# Patient Record
Sex: MALE | Race: WHITE | NOT HISPANIC OR LATINO | URBAN - METROPOLITAN AREA
[De-identification: names, ages, dates, MRNs, and addresses within clinical notes are randomized per-mention and may not be internally consistent; named-entity substitution may affect disease eponyms.]

---

## 2020-05-16 ENCOUNTER — EMERGENCY (EMERGENCY)
Facility: HOSPITAL | Age: 35
LOS: 0 days | Discharge: HOME | End: 2020-05-16
Attending: EMERGENCY MEDICINE | Admitting: EMERGENCY MEDICINE
Payer: COMMERCIAL

## 2020-05-16 VITALS
HEART RATE: 88 BPM | HEIGHT: 70 IN | SYSTOLIC BLOOD PRESSURE: 128 MMHG | TEMPERATURE: 98 F | DIASTOLIC BLOOD PRESSURE: 79 MMHG | WEIGHT: 153 LBS | OXYGEN SATURATION: 100 % | RESPIRATION RATE: 18 BRPM
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
## 2020-05-16 DIAGNOSIS — S61.219A LACERATION WITHOUT FOREIGN BODY OF UNSPECIFIED FINGER WITHOUT DAMAGE TO NAIL, INITIAL ENCOUNTER: ICD-10-CM
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
## 2020-05-16 DIAGNOSIS — S61.313A LACERATION WITHOUT FOREIGN BODY OF LEFT MIDDLE FINGER WITH DAMAGE TO NAIL, INITIAL ENCOUNTER: ICD-10-CM
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
## 2020-05-16 DIAGNOSIS — S62.663B NONDISPLACED FRACTURE OF DISTAL PHALANX OF LEFT MIDDLE FINGER, INITIAL ENCOUNTER FOR OPEN FRACTURE: ICD-10-CM

## 2020-05-16 PROCEDURE — 99284 EMERGENCY DEPT VISIT MOD MDM: CPT | Mod: 25

## 2020-05-16 PROCEDURE — 73140 X-RAY EXAM OF FINGER(S): CPT | Mod: 26,LT

## 2020-05-16 PROCEDURE — 12002 RPR S/N/AX/GEN/TRNK2.6-7.5CM: CPT | Mod: 59

## 2020-05-16 PROCEDURE — 26750 TREAT FINGER FRACTURE EACH: CPT | Mod: 54,F2

## 2020-05-16 RX ORDER — IBUPROFEN 200 MG
600 TABLET ORAL ONCE
Refills: 0 | Status: COMPLETED | OUTPATIENT
Start: 2020-05-16 | End: 2020-05-16

## 2020-05-16 RX ORDER — CEPHALEXIN 500 MG
1 CAPSULE ORAL
Qty: 40 | Refills: 0
Start: 2020-05-16 | End: 2020-05-25

## 2020-05-16 RX ORDER — CEFAZOLIN SODIUM 1 G
2000 VIAL (EA) INJECTION ONCE
Refills: 0 | Status: COMPLETED | OUTPATIENT
Start: 2020-05-16 | End: 2020-05-16

## 2020-05-16 RX ADMIN — Medication 100 MILLIGRAM(S): at 17:17

## 2020-05-16 NOTE — ED PROVIDER NOTE - ATTENDING CONTRIBUTION TO CARE
I personally evaluated the patient. I reviewed the Resident’s or Physician Assistant’s note (as assigned above), and agree with the findings and plan except as documented in my note.     34 male here for lacerations to left third and fourth digits from a table saw accident prior to arrival.     ROS otherwise unremarkable    PE: male in no distress. CV: pulses intact. CHEST: normal work of breathing. ABD: nondistended. SKIN: lacerations to left third and fourth digits distal aspect with tissue destruction to third. EXT: FROM. no evidence of tendon injury. NEURO: AAO 3 no focal deficits.     Impression: finger laceration    Plan: wound management, Hand consult, outpatient care

## 2020-05-16 NOTE — ED PROVIDER NOTE - NS ED ROS FT
Review of Systems         Constitutional: (-) fever (-) chills (-) weakness       EENT:(-) sore throat (-) congestion       Cardiovascular: (-) chest pain (-) syncope       Respiratory: (-) cough, (-) shortness of breath       Gastrointestinal: (-) abdominal pain (-) vomiting (-) diarrhea (-) nausea (-) constipation       Genitourinary: (-) dysuria (-) frequency (-) hematuria       Musculoskeletal: (-) neck pain (-) back pain (+) joint pain       Integumentary: (-) rash       Neurological: (-) headache       Psych: (-) psych history

## 2020-05-16 NOTE — ED PROCEDURE NOTE - CPROC ED POST PROC CARE GUIDE1
Instructed patient/caregiver to follow-up with primary care physician./Verbal/written post procedure instructions were given to patient/caregiver./Instructed patient/caregiver regarding signs and symptoms of infection./Elevate the injured extremity as instructed./Keep the cast/splint/dressing clean and dry.
Instructed patient/caregiver to follow-up with primary care physician./Verbal/written post procedure instructions were given to patient/caregiver./Keep the cast/splint/dressing clean and dry./Instructed patient/caregiver regarding signs and symptoms of infection.
Verbal/written post procedure instructions were given to patient/caregiver./Instructed patient/caregiver regarding signs and symptoms of infection./Elevate the injured extremity as instructed./Keep the cast/splint/dressing clean and dry./Instructed patient/caregiver to follow-up with primary care physician.
Verbal/written post procedure instructions were given to patient/caregiver./Instructed patient/caregiver regarding signs and symptoms of infection./Keep the cast/splint/dressing clean and dry./Instructed patient/caregiver to follow-up with primary care physician.

## 2020-05-16 NOTE — CONSULT NOTE ADULT - ASSESSMENT
35 y/o male with table saw injury today and sustained Left 3rd digit tip evulsion laceration of pulp with non-displaced distal phalanx fracture & 4th digit small tip laceration.

## 2020-05-16 NOTE — ED PROVIDER NOTE - NSFOLLOWUPINSTRUCTIONS_ED_ALL_ED_FT
-Follow up with Dr. Roa in 1-3 days: CALL [phone] for appointment  -Take prescribed antibiotics as prescribed for your infection.  -Take 400-800 mg of Ibuprofen every 6-8 hours as needed for pain with food; food first, then medicine  -Take Tylenol 500-1000 mg every 4-6 hours as needed for pain; Do not exceed 1000 mg every 6 hours or 4000 mg per 24 hours  -RICE (rest, ice compression, elevation) for your injuries; see below  -Return to ED for worsening symptoms or concerns.    Sutured Wound Care  Sutures are stitches that can be used to close wounds. Taking care of your wound properly can help to prevent pain and infection. It can also help your wound to heal more quickly. Follow instructions from your health care provider about how to care for your sutured wound.    Supplies needed:  Soap and water.  A clean bandage (dressing), if needed.  Antibiotic ointment.  A clean towel.  How to care for your sutured wound  Keep the wound completely dry for the first 24 hours, or for as long as directed by your health care provider. After 24–48 hours, you may shower or bathe as directed by your health care provider. Do not soak or submerge the wound in water until the sutures have been removed.  After the first 24 hours, clean the wound once a day, or as often as directed by your health care provider, using the following steps:    Wash the wound with soap and water.  Rinse the wound with water to remove all soap.  Pat the wound dry with a clean towel. Do not rub the wound.    After cleaning the wound, apply a thin layer of antibiotic ointment as directed by your health care provider. This will prevent infection and keep the dressing from sticking to the wound.  Follow instructions from your health care provider about how to change your dressing:    Wash your hands with soap and water. If soap and water are not available, use hand .  Change your dressing at least once a day, or as often as told by your health care provider. If your dressing gets wet or dirty, change it.  Leave sutures and other skin closures, such as adhesive tape or skin glue, in place. These skin closures may need to stay in place for 2 weeks or longer. If adhesive strip edges start to loosen and curl up, you may trim the loose edges. Do not remove adhesive strips completely unless your health care provider tells you to do that.    Check your wound every day for signs of infection. Watch for:    Redness, swelling, or pain.  Fluid or blood.  Warmth.  Pus or a bad smell.    ImageHave the sutures removed as directed by your health care provider.  Follow these instructions at home:  Medicines     Take or apply over-the-counter and prescription medicines only as told by your health care provider.  If you were prescribed an antibiotic medicine or ointment, take or apply it as told by your health care provider. Do not stop using the antibiotic even if your condition improves.  General instructions     To help reduce scarring after your wound heals, cover your wound with clothing or apply sunscreen of at least 30 SPF whenever you are outside.  Do not scratch or pick at your wound.  Avoid stretching your wound.  Raise (elevate) the injured area above the level of your heart while you are sitting or lying down, if possible.  Drink enough fluids to keep your urine clear or pale yellow.  Keep all follow-up visits as told by your health care provider. This is important.  Contact a health care provider if:  You received a tetanus shot and you have swelling, severe pain, redness, or bleeding at the injection site.  Your wound breaks open.  You have redness, swelling, or pain around your wound.  You have fluid or blood coming from your wound.  Your wound feels warm to the touch.  You have a fever.  You notice something coming out of your wound, such as wood or glass.  You have pain that does not get better with medicine.  The skin near your wound changes color.  You need to change your dressing very frequently due to a lot of fluid, blood, or pus draining from the wound.  You develop a new rash.  You develop numbness around the wound.  Get help right away if:  You develop severe swelling around your wound.  You have pus or a bad smell coming from your wound.  Your pain suddenly gets worse and is severe.  You develop painful lumps near your wound or anywhere on your body.  You have a red streak going away from your wound.  The wound is on your hand or foot and:    You cannot properly move a finger or toe.  Your fingers or toes look pale or bluish.  You have numbness that is spreading down your hand, foot, fingers, or toes.    Summary  Sutures are stitches that can be used to close wounds.  Taking care of your wound properly can help to prevent pain and infection.  Keep the wound completely dry for the first 24 hours, or for as long as directed by your health care provider. After 24–48 hours, you may shower or bathe as directed by your health care provider.    Laceration    A laceration is a cut that goes through all of the layers of the skin and into the tissue that is right under the skin. Some lacerations heal on their own. Others need to be closed with skin adhesive strips, skin glue, stitches (sutures), or staples. Proper laceration care minimizes the risk of infection and helps the laceration to heal better.  If non-absorbable stitches or staples have been placed, they must be taken out within the time frame instructed by your healthcare provider.    SEEK IMMEDIATE MEDICAL CARE IF YOU HAVE ANY OF THE FOLLOWING SYMPTOMS: swelling around the wound, worsening pain, drainage from the wound, red streaking going away from your wound, inability to move finger or toe near the laceration, or discoloration of skin near the laceration.    Fracture    A fracture is a break in one of your bones. This can occur from a variety of injuries, especially traumatic ones. Symptoms include pain, bruising, or swelling. Do not use the injured limb. If a fracture is in one of the bones below your waist, do not put weight on that limb unless instructed to do so by your healthcare provider. Crutches or a cane may have been provided. A splint or cast may have been applied by your health care provider. Make sure to keep it dry and follow up with an orthopedist as instructed.    SEEK IMMEDIATE MEDICAL CARE IF YOU HAVE ANY OF THE FOLLOWING SYMPTOMS: numbness, tingling, increasing pain, or weakness in any part of the injured limb.

## 2020-05-16 NOTE — ED PROVIDER NOTE - CARE PROVIDER_API CALL
Kim Roa (MD)  [facility]  [address] [place] [address]  Phone: [phone]  Fax: [phone]  Follow Up Time: 1-3 Days

## 2020-05-16 NOTE — ED ADULT NURSE NOTE - NSIMPLEMENTINTERV_GEN_ALL_ED
Implemented All Universal Safety Interventions:  [place] to call system. Call bell, personal items and telephone within reach. Instruct patient to call for assistance. Room bathroom lighting operational. Non-slip footwear when patient is off stretcher. Physically safe environment: no spills, clutter or unnecessary equipment. Stretcher in lowest position, wheels locked, appropriate side rails in place.

## 2020-05-16 NOTE — ED PROVIDER NOTE - OBJECTIVE STATEMENT
34 year old male no past medical history p/w lacerations. 30 minutes ago patient was using a table saw when he cut his left 3rd and 4th digit. bleeding controlled with direct pressure no numbness, paresthesias or weakness. Pain moderate, unchanged, no pall/prov factors. Right handed. tetanus UTD.

## 2020-05-16 NOTE — CONSULT NOTE ADULT - PROBLEM SELECTOR RECOMMENDATION 9
(3rd & 4th digits)    - Case d/w Orthopaedic resident (Spectra # [id number]) and photos taken & sent (Pt agreed).  He states d/w Dr. Roa.    - Dr. Roa recommends:    1. Irrigate wounds.  2. Close with Nylon sutures.  3. Apply finger splint to 3rd digit.  4. Discharge with PO antibiotics.  5. Instruct patient to call Dr. Roa's office on Monday to arrange an appointment / follow up for Tues or Wed (5/19 or 5/20).    * Plan discussed with Patient and he verbalized understanding of plan & instructions and all questions answered to patient's satisfaction.     * Dr. Roa's recommendations also d/w ER ALESHIA Cornelius.

## 2020-05-16 NOTE — ED PROVIDER NOTE - CLINICAL SUMMARY MEDICAL DECISION MAKING FREE TEXT BOX
34 male here for finger laceration. Had wound management, up to date on tetanus, ABX, hand consult, will discharge with outpatient management.

## 2020-05-16 NOTE — ED PROVIDER NOTE - CARE PROVIDERS DIRECT ADDRESSES
[email or web address]

## 2020-05-16 NOTE — ED PROVIDER NOTE - PHYSICAL EXAMINATION
Physical Exam    Vital Signs: I have reviewed the initial vital signs  Constitutional: well-nourished, appears stated age, no acute distress  Musculoskeletal: supple nontender neck, no midline tenderness, left hand: avulsive injury to left 3rd and 4th digit with nailbed involvement on left 3rd digit. fat exposed but no visible tendon injury. Radial pulse 2 +, no thenar atrophy. Wrist extension/flexion 5/5. Sensation intact throughout hand with all extensor/flexor mechanisms intact. No scaphoid tenderness. Full ROM and patient able to make a fist. Thumb intact in extension, flexion, opposition, abduction/adduction.  Integumentary: warm, dry, no rash  Neurologic: A & O x 3, CN II-XII grossly intact, all extremities’ motor and sensory functions grossly intact  Psychiatric: appropriate mood, appropriate affect

## 2020-05-16 NOTE — CONSULT NOTE ADULT - SUBJECTIVE AND OBJECTIVE BOX
Patient is a 33 y/o Right hand dominant male with no medical history presenting in ER with c/o lacerations on his Left 3rd & 4th distal digits he sustained on a table saw a couple of hours ago.    Patient reports he held pressure on the fingers immediately so bleeding has been controlled since injury.    Reports had moderate pain earlier but improved after ER PA placed a digital block.    Reports sensation has been normal distally to both affected fingertips.    Denies numbness, paresthesias or weakness with the digits.          PAST MEDICAL & SURGICAL HISTORY:  No pertinent past medical history  No significant past surgical history        MEDICATIONS  (STANDING):  Cefazolin  IVPB 2000 milliGRAM(s) IV Intermittent once (Given in ER)         Allergies  No Known Allergies      SOCIAL HISTORY:  Tobacco use:  Denies use  Alcohol use:  Socially  Drug use: Denies use      FAMILY HISTORY:  Non-contributory        Vital Signs Last 24 Hrs  T(C): 36.4 (16 May 2020 15:35), Max: 36.4 (16 May 2020 15:35)  T(F): 97.5 (16 May 2020 15:35), Max: 97.5 (16 May 2020 15:35)  HR: 88 (16 May 2020 15:35) (88 - 88)  BP: 128/79 (16 May 2020 15:35) (128/79 - 128/79)  RR: 18 (16 May 2020 15:35) (18 - 18)  SpO2: 100% (16 May 2020 15:35) (100% - 100%)          Physical Exam:  General:  WD, WN, conversant in NAD sitting on ER table.  Left Upper Extremity:    Left 3rd distal digit evulsion laceration involving the finger pulp and a small area of nail bed involvement on lateral 3rd digit .  Slight oozing noted but no arterial bleeding noted.   Normal strength and ROM, normal sensation to light touch on finger and distal tip.      Left 4th digit small laceration to distal tip and tip of nail bed. No active bleeding noted.  Normal strength and ROM, normal sensation to light touch on finger and distal tip.                LABS:  Not drawn by ER        Xray Left hand/ fingers:  Unofficial read by myself appears to have a non-displaced 3rd distal left phalanx fracture noted on lateral view.  (Official read pending)

## 2020-05-16 NOTE — ED PROVIDER NOTE - PATIENT PORTAL LINK FT
You can access the FollowMyHealth Patient Portal offered by [facility] by registering at the following website: [email or web address]. By joining [employer]’s FollowMyHealth portal, you will also be able to view your health information using other applications (apps) compatible with our system.

## 2020-05-16 NOTE — ED PROVIDER NOTE - CARE PLAN
Principal Discharge DX:	Open fracture of phalanx of left hand  Secondary Diagnosis:	Laceration of finger

## 2020-05-17 ENCOUNTER — EMERGENCY (EMERGENCY)
Facility: HOSPITAL | Age: 35
LOS: 0 days | Discharge: HOME | End: 2020-05-17
Attending: EMERGENCY MEDICINE | Admitting: EMERGENCY MEDICINE
Payer: COMMERCIAL

## 2020-05-17 VITALS
DIASTOLIC BLOOD PRESSURE: 90 MMHG | OXYGEN SATURATION: 100 % | SYSTOLIC BLOOD PRESSURE: 144 MMHG | HEIGHT: 70 IN | HEART RATE: 86 BPM | WEIGHT: 154.98 LBS | TEMPERATURE: 97 F | RESPIRATION RATE: 16 BRPM
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
## 2020-05-17 DIAGNOSIS — Z48.01 ENCOUNTER FOR CHANGE OR REMOVAL OF SURGICAL WOUND DRESSING: ICD-10-CM

## 2020-05-17 DIAGNOSIS — S61.211D LACERATION WITHOUT FOREIGN BODY OF LEFT INDEX FINGER WITHOUT DAMAGE TO NAIL, SUBSEQUENT ENCOUNTER: ICD-10-CM

## 2020-05-17 PROCEDURE — 64450 NJX AA&/STRD OTHER PN/BRANCH: CPT

## 2020-05-17 PROCEDURE — 99282 EMERGENCY DEPT VISIT SF MDM: CPT | Mod: 25

## 2020-05-17 NOTE — ED PROVIDER NOTE - CLINICAL SUMMARY MEDICAL DECISION MAKING FREE TEXT BOX
Patient had successful replacement of dressing for his laceration he has an existing follow up with dr barber we discussed indications for return at this time

## 2020-05-17 NOTE — ED PROVIDER NOTE - OBJECTIVE STATEMENT
35 yo male, no pmh, presents to ed for wound check. pt seen in ed yesterday, had laceration/avulsion to left 3rd digit, fu with hand surgeon tomorrow, here since gauze dried on wound and wanted to have changed. no dc, fever, limited rom. baseline pain, mild, aching, no radiation at site of laceration.

## 2020-05-17 NOTE — ED PROVIDER NOTE - PROGRESS NOTE DETAILS
I personally evaluated the patient. I reviewed the Resident’s or Physician Assistant’s note (as assigned above), and agree with the findings and plan except as documented in my note.  33 y/o M seen here yesterday for wound repair of left index finger and returns for wound evaluation. On exam: FROM of digits, bleeding controlled, no signs of infection. A/P: will re-evaluate. Pt has existing f/u with Dr. Roa from Ortho.

## 2020-05-17 NOTE — ED ADULT NURSE NOTE - NSIMPLEMENTINTERV_GEN_ALL_ED
Implemented All Universal Safety Interventions:  [place] to call system. Call bell, personal items and telephone within reach. Instruct patient to call for assistance. Room bathroom lighting operational. Non-slip footwear when patient is off stretcher. Physically safe environment: no spills, clutter or unnecessary equipment. Stretcher in lowest position, wheels locked, appropriate side rails in place.

## 2020-05-17 NOTE — ED PROCEDURE NOTE - GENERAL PROCEDURE DETAILS
digital block on left 3rd digit, driedgause removed s/p soaking in warm sterile water with betadine, distal tip of finger dried wrapped in vasoline dressing with finger splint applied.

## 2020-05-17 NOTE — ED PROVIDER NOTE - PHYSICAL EXAMINATION
Physical Exam    Vital Signs: I have reviewed the initial vital signs.  Constitutional: well-nourished, appears stated age, no acute distress  Eyes: Conjunctiva pink, Sclera clear,  Cardiovascular: S1 and S2, regular rate, regular rhythm, well-perfused extremities, radial pulses equal and 2+  Musculoskeletal: supple neck, no lower extremity edema, no midline tenderness, from of affected digit  Integumentary: sutures in place, dressing removed, no dc.   Neurologic: awake, alert, nvi

## 2020-05-17 NOTE — ED PROVIDER NOTE - NS ED ROS FT
Constitutional: (-) weakness  Musculoskeletal: (-) joint pain,  Integumentary: (-) bleeding, (+) lac  Neurological: (-) tingling, (-)numbness,  Allergic/Immunologic: (-) pruritus

## 2020-05-17 NOTE — ED PROVIDER NOTE - ATTENDING CONTRIBUTION TO CARE
I was present for and supervised the key and critical aspects of the procedures performed during the care of the patient. I personally evaluated the patient. I reviewed the Resident’s or Physician Assistant’s note (as assigned above), and agree with the findings and plan except as documented in my note.  35 y/o M seen here yesterday for wound repair of left index finger and returns for wound evaluation. On exam: FROM of digits, bleeding controlled, no signs of infection. A/P: will re-evaluate. Pt has existing f/u with Dr. Roa from Ortho.

## 2020-05-17 NOTE — ED PROVIDER NOTE - PATIENT PORTAL LINK FT
You can access the FollowMyHealth Patient Portal offered by [facility] by registering at the following website: [email or web address]. By joining [employer]’s FollowMyHealth portal, you will also be able to view your health information using other applications (apps) compatible with our system.

## 2020-05-17 NOTE — ED PROCEDURE NOTE - CPROC ED POST PROC CARE GUIDE1
Verbal/written post procedure instructions were given to patient/caregiver./Instructed patient/caregiver to follow-up with primary care physician./Keep the cast/splint/dressing clean and dry./Instructed patient/caregiver regarding signs and symptoms of infection.
Instructed patient/caregiver to follow-up with primary care physician./Elevate the injured extremity as instructed./Instructed patient/caregiver regarding signs and symptoms of infection./Verbal/written post procedure instructions were given to patient/caregiver.

## 2020-05-18 PROBLEM — Z78.9 OTHER SPECIFIED HEALTH STATUS: Chronic | Status: ACTIVE | Noted: 2020-05-16

## 2024-02-24 NOTE — ED ADULT NURSE NOTE - NSFALLRSKASSESSDT_ED_ALL_ED
Medicare Preventive Visit Patient Instructions  Thank you for completing your Welcome to Medicare Visit or Medicare Annual Wellness Visit today. Your next wellness visit will be due in one year (2/24/2025).  The screening/preventive services that you may require over the next 5-10 years are detailed below. Some tests may not apply to you based off risk factors and/or age. Screening tests ordered at today's visit but not completed yet may show as past due. Also, please note that scanned in results may not display below.  Preventive Screenings:  Service Recommendations Previous Testing/Comments   Colorectal Cancer Screening  Colonoscopy    Fecal Occult Blood Test (FOBT)/Fecal Immunochemical Test (FIT)  Fecal DNA/Cologuard Test  Flexible Sigmoidoscopy Age: 45-75 years old   Colonoscopy: every 10 years (May be performed more frequently if at higher risk)  OR  FOBT/FIT: every 1 year  OR  Cologuard: every 3 years  OR  Sigmoidoscopy: every 5 years  Screening may be recommended earlier than age 45 if at higher risk for colorectal cancer. Also, an individualized decision between you and your healthcare provider will decide whether screening between the ages of 76-85 would be appropriate. Colonoscopy: 06/14/2023  FOBT/FIT: Not on file  Cologuard: Not on file  Sigmoidoscopy: Not on file          Prostate Cancer Screening Individualized decision between patient and health care provider in men between ages of 55-69   Medicare will cover every 12 months beginning on the day after your 50th birthday PSA: 0.69 ng/mL           Hepatitis C Screening Once for adults born between 1945 and 1965  More frequently in patients at high risk for Hepatitis C Hep C Antibody: 09/21/2021        Diabetes Screening 1-2 times per year if you're at risk for diabetes or have pre-diabetes Fasting glucose: 106 mg/dL (8/1/2023)  A1C: 5.4 % (8/1/2023)      Cholesterol Screening Once every 5 years if you don't have a lipid disorder. May order more often  based on risk factors. Lipid panel: 11/22/2022         Other Preventive Screenings Covered by Medicare:  Abdominal Aortic Aneurysm (AAA) Screening: covered once if your at risk. You're considered to be at risk if you have a family history of AAA or a male between the age of 65-75 who smoking at least 100 cigarettes in your lifetime.  Lung Cancer Screening: covers low dose CT scan once per year if you meet all of the following conditions: (1) Age 55-77; (2) No signs or symptoms of lung cancer; (3) Current smoker or have quit smoking within the last 15 years; (4) You have a tobacco smoking history of at least 20 pack years (packs per day x number of years you smoked); (5) You get a written order from a healthcare provider.  Glaucoma Screening: covered annually if you're considered high risk: (1) You have diabetes OR (2) Family history of glaucoma OR (3)  aged 50 and older OR (4)  American aged 65 and older  Osteoporosis Screening: covered every 2 years if you meet one of the following conditions: (1) Have a vertebral abnormality; (2) On glucocorticoid therapy for more than 3 months; (3) Have primary hyperparathyroidism; (4) On osteoporosis medications and need to assess response to drug therapy.  HIV Screening: covered annually if you're between the age of 15-65. Also covered annually if you are younger than 15 and older than 65 with risk factors for HIV infection. For pregnant patients, it is covered up to 3 times per pregnancy.    Immunizations:  Immunization Recommendations   Influenza Vaccine Annual influenza vaccination during flu season is recommended for all persons aged >= 6 months who do not have contraindications   Pneumococcal Vaccine   * Pneumococcal conjugate vaccine = PCV13 (Prevnar 13), PCV15 (Vaxneuvance), PCV20 (Prevnar 20)  * Pneumococcal polysaccharide vaccine = PPSV23 (Pneumovax) Adults 19-65 yo with certain risk factors or if 65+ yo  If never received any pneumonia vaccine:  recommend Prevnar 20 (PCV20)  Give PCV20 if previously received 1 dose of PCV13 or PPSV23   Hepatitis B Vaccine 3 dose series if at intermediate or high risk (ex: diabetes, end stage renal disease, liver disease)   Respiratory syncytial virus (RSV) Vaccine - COVERED BY MEDICARE PART D  * RSVPreF3 (Arexvy) CDC recommends that adults 60 years of age and older may receive a single dose of RSV vaccine using shared clinical decision-making (SCDM)   Tetanus (Td) Vaccine - COST NOT COVERED BY MEDICARE PART B Following completion of primary series, a booster dose should be given every 10 years to maintain immunity against tetanus. Td may also be given as tetanus wound prophylaxis.   Tdap Vaccine - COST NOT COVERED BY MEDICARE PART B Recommended at least once for all adults. For pregnant patients, recommended with each pregnancy.   Shingles Vaccine (Shingrix) - COST NOT COVERED BY MEDICARE PART B  2 shot series recommended in those 19 years and older who have or will have weakened immune systems or those 50 years and older     Health Maintenance Due:      Topic Date Due   • Colorectal Cancer Screening  06/12/2028   • Hepatitis C Screening  Completed     Immunizations Due:      Topic Date Due   • Influenza Vaccine (1) 09/01/2023   • COVID-19 Vaccine (7 - 2023-24 season) 01/24/2024     Advance Directives   What are advance directives?  Advance directives are legal documents that state your wishes and plans for medical care. These plans are made ahead of time in case you lose your ability to make decisions for yourself. Advance directives can apply to any medical decision, such as the treatments you want, and if you want to donate organs.   What are the types of advance directives?  There are many types of advance directives, and each state has rules about how to use them. You may choose a combination of any of the following:  Living will:  This is a written record of the treatment you want. You can also choose which treatments  you do not want, which to limit, and which to stop at a certain time. This includes surgery, medicine, IV fluid, and tube feedings.   Durable power of  for healthcare (DPAHC):  This is a written record that states who you want to make healthcare choices for you when you are unable to make them for yourself. This person, called a proxy, is usually a family member or a friend. You may choose more than 1 proxy.  Do not resuscitate (DNR) order:  A DNR order is used in case your heart stops beating or you stop breathing. It is a request not to have certain forms of treatment, such as CPR. A DNR order may be included in other types of advance directives.  Medical directive:  This covers the care that you want if you are in a coma, near death, or unable to make decisions for yourself. You can list the treatments you want for each condition. Treatment may include pain medicine, surgery, blood transfusions, dialysis, IV or tube feedings, and a ventilator (breathing machine).  Values history:  This document has questions about your views, beliefs, and how you feel and think about life. This information can help others choose the care that you would choose.  Why are advance directives important?  An advance directive helps you control your care. Although spoken wishes may be used, it is better to have your wishes written down. Spoken wishes can be misunderstood, or not followed. Treatments may be given even if you do not want them. An advance directive may make it easier for your family to make difficult choices about your care.   Weight Management   Why it is important to manage your weight:  Being overweight increases your risk of health conditions such as heart disease, high blood pressure, type 2 diabetes, and certain types of cancer. It can also increase your risk for osteoarthritis, sleep apnea, and other respiratory problems. Aim for a slow, steady weight loss. Even a small amount of weight loss can lower your  risk of health problems.  How to lose weight safely:  A safe and healthy way to lose weight is to eat fewer calories and get regular exercise. You can lose up about 1 pound a week by decreasing the number of calories you eat by 500 calories each day.   Healthy meal plan for weight management:  A healthy meal plan includes a variety of foods, contains fewer calories, and helps you stay healthy. A healthy meal plan includes the following:  Eat whole-grain foods more often.  A healthy meal plan should contain fiber. Fiber is the part of grains, fruits, and vegetables that is not broken down by your body. Whole-grain foods are healthy and provide extra fiber in your diet. Some examples of whole-grain foods are whole-wheat breads and pastas, oatmeal, brown rice, and bulgur.  Eat a variety of vegetables every day.  Include dark, leafy greens such as spinach, kale, [place] greens, and mustard greens. Eat yellow and orange vegetables such as carrots, sweet potatoes, and winter squash.   Eat a variety of fruits every day.  Choose fresh or canned fruit (canned in its own juice or light syrup) instead of juice. Fruit juice has very little or no fiber.  Eat low-fat dairy foods.  Drink fat-free (skim) milk or 1% milk. Eat fat-free yogurt and low-fat cottage cheese. Try low-fat cheeses such as mozzarella and other reduced-fat cheeses.  Choose meat and other protein foods that are low in fat.  Choose beans or other legumes such as split peas or lentils. Choose fish, skinless poultry (chicken or turkey), or lean cuts of red meat (beef or pork). Before you cook meat or poultry, cut off any visible fat.   Use less fat and oil.  Try baking foods instead of frying them. Add less fat, such as margarine, sour cream, regular salad dressing and mayonnaise to foods. Eat fewer high-fat foods. Some examples of high-fat foods include french fries, doughnuts, ice cream, and cakes.  Eat fewer sweets.  Limit foods and drinks that are high in  sugar. This includes candy, cookies, regular soda, and sweetened drinks.  Exercise:  Exercise at least 30 minutes per day on most days of the week. Some examples of exercise include walking, biking, dancing, and swimming. You can also fit in more physical activity by taking the stairs instead of the elevator or parking farther away from stores. Ask your healthcare provider about the best exercise plan for you.      © Copyright [employer] 2018 Information is for End User's use only and may not be sold, redistributed or otherwise used for commercial purposes. All illustrations and images included in CareNotes® are the copyrighted property of [employer] or [employer]     16-May-2020 15:43

## 2024-03-06 NOTE — ED ADULT NURSE NOTE - CHIEF COMPLAINT
Pt to ped w/ c/o fever, cough/congestion x3 days. Also c/o dizziness when he stands up.  
The patient is a 34y Male complaining of wound check.

## 2024-03-11 NOTE — ED ADULT NURSE NOTE - PMH
No pertinent past medical history Do not submerge in water <<----- Click to add NO pertinent Past Medical History